# Patient Record
Sex: FEMALE | Race: WHITE
[De-identification: names, ages, dates, MRNs, and addresses within clinical notes are randomized per-mention and may not be internally consistent; named-entity substitution may affect disease eponyms.]

---

## 2017-08-25 ENCOUNTER — HOSPITAL ENCOUNTER (EMERGENCY)
Dept: HOSPITAL 1 - ED | Age: 22
LOS: 1 days | Discharge: HOME | End: 2017-08-26
Payer: COMMERCIAL

## 2017-08-25 DIAGNOSIS — T63.481A: Primary | ICD-10-CM
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
## 2017-08-26 ENCOUNTER — HOSPITAL ENCOUNTER (EMERGENCY)
Dept: HOSPITAL 1 - ED | Age: 22
Discharge: HOME | End: 2017-08-26
Payer: COMMERCIAL

## 2017-08-26 VITALS — DIASTOLIC BLOOD PRESSURE: 56 MMHG | SYSTOLIC BLOOD PRESSURE: 130 MMHG

## 2017-08-26 VITALS — SYSTOLIC BLOOD PRESSURE: 122 MMHG | DIASTOLIC BLOOD PRESSURE: 79 MMHG

## 2017-08-26 DIAGNOSIS — S50.862A: Primary | ICD-10-CM

## 2017-08-26 DIAGNOSIS — E87.6: ICD-10-CM

## 2017-08-26 DIAGNOSIS — R20.2: ICD-10-CM

## 2017-08-26 LAB
BASOPHILS NFR BLD: 0.2 % (ref 0–2)
BUN SERPL-MCNC: 13 MG/DL (ref 7–18)
CALCIUM SERPL-MCNC: 9 MG/DL (ref 8.5–10.1)
CHLORIDE SERPL-SCNC: 104 MMOL/L (ref 98–107)
CO2 SERPL-SCNC: 26.4 MMOL/L (ref 21–32)
CREAT SERPL-MCNC: 0.7 MG/DL (ref 0.6–1)
ERYTHROCYTE [DISTWIDTH] IN BLOOD BY AUTOMATED COUNT: 13.7 % (ref 11.5–14.5)
GFR SERPLBLD BASED ON 1.73 SQ M-ARVRAT: > 60 ML/MIN
GLUCOSE SERPL-MCNC: 73 MG/DL (ref 74–106)
PLATELET # BLD: 259 X10^3MCL (ref 130–400)
POTASSIUM SERPL-SCNC: 3.3 MMOL/L (ref 3.5–5.1)
SODIUM SERPL-SCNC: 137 MMOL/L (ref 136–145)

## 2018-11-07 ENCOUNTER — HOSPITAL ENCOUNTER (EMERGENCY)
Dept: HOSPITAL 26 - MED | Age: 23
Discharge: HOME | End: 2018-11-07
Payer: MEDICAID

## 2018-11-07 VITALS — WEIGHT: 132 LBS | HEIGHT: 57 IN | BODY MASS INDEX: 28.48 KG/M2

## 2018-11-07 VITALS — SYSTOLIC BLOOD PRESSURE: 130 MMHG | DIASTOLIC BLOOD PRESSURE: 79 MMHG

## 2018-11-07 DIAGNOSIS — F41.9: Primary | ICD-10-CM

## 2018-11-07 NOTE — NUR
C/O 'ANXIETY ATTACK' AT WORK TODAY AROUND 1430. PT STATES SHE FELT SHORT OF 
BREATH, NUMBNESS AND TINGLING OF HANDS AND FEET, POUNDING HEADACHE, AND RAPID 
HEART RATE. PT STATES SYMPTOMS SUBSIDED AFTER 30MIN. PT STATES SHE HAS 
CONTINUED TINGLING IN THE HANDS AND MILD HEADACHE.  DENIES N/V/D; SKIN IS 
PINK/WARM/DRY; AAOX4 WITH EVEN AND STEADY GAIT; LUNGS CLEAR BL; HR EVEN AND 
REGULAR; PT DENIES ANY FEVER, CP, SOB, OR COUGH AT THIS TIME; PATIENT STATES 
PAIN OF 6/10 AT THIS TIME; VSS; PATIENT POSITIONED FOR COMFORT; HOB ELEVATED; 
BEDRAILS UP X2; BED DOWN. ER MD MADE AWARE OF PT STATUS.

## 2018-11-07 NOTE — NUR
Patient discharged with v/s stable. Written and verbal after care instructions 
given and explained. 

Patient alert, oriented and verbalized understanding of instructions. 
Ambulatory with steady gait. All questions addressed prior to discharge. ID 
band removed. Patient advised to follow up with PMD. Rx of ATARAX given. 
Patient educated on indication of medication including possible reaction and 
side effects. Opportunity to ask questions provided and answered.

## 2019-03-18 ENCOUNTER — HOSPITAL ENCOUNTER (EMERGENCY)
Dept: HOSPITAL 26 - MED | Age: 24
LOS: 1 days | Discharge: HOME | End: 2019-03-19
Payer: COMMERCIAL

## 2019-03-18 VITALS — SYSTOLIC BLOOD PRESSURE: 124 MMHG | DIASTOLIC BLOOD PRESSURE: 70 MMHG

## 2019-03-18 VITALS — WEIGHT: 130 LBS | BODY MASS INDEX: 28.05 KG/M2 | HEIGHT: 57 IN
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
## 2019-03-18 DIAGNOSIS — S93.402A: Primary | ICD-10-CM

## 2019-03-18 PROCEDURE — 99283 EMERGENCY DEPT VISIT LOW MDM: CPT

## 2019-03-18 PROCEDURE — 73610 X-RAY EXAM OF ANKLE: CPT

## 2019-03-18 PROCEDURE — 96372 THER/PROPH/DIAG INJ SC/IM: CPT

## 2019-03-18 NOTE — NUR
PT TO ED WITH C/O L ANKLE PAIN S/P SLIP AND FALL TODAY. SWELLING NOTED TO L 
ANKLE. +ROM. CMS INTACT. NO OBVIOUS DEFOMRITY NOTED. PULSES PRESENT AND STRONG. 
PT PLACED INTO BED, PENDING MD SAWYER. 



PMH--DENIES

NKDA

## 2019-03-19 VITALS — DIASTOLIC BLOOD PRESSURE: 73 MMHG | SYSTOLIC BLOOD PRESSURE: 121 MMHG
